# Patient Record
Sex: FEMALE | Race: WHITE
[De-identification: names, ages, dates, MRNs, and addresses within clinical notes are randomized per-mention and may not be internally consistent; named-entity substitution may affect disease eponyms.]

---

## 2018-01-10 ENCOUNTER — HOSPITAL ENCOUNTER (OUTPATIENT)
Dept: HOSPITAL 54 - DS | Age: 53
Discharge: HOME | End: 2018-01-10
Attending: SPECIALIST
Payer: COMMERCIAL

## 2018-01-10 DIAGNOSIS — Z88.8: ICD-10-CM

## 2018-01-10 DIAGNOSIS — Z87.891: ICD-10-CM

## 2018-01-10 DIAGNOSIS — G47.33: ICD-10-CM

## 2018-01-10 DIAGNOSIS — M65.311: ICD-10-CM

## 2018-01-10 DIAGNOSIS — G56.01: Primary | ICD-10-CM

## 2018-01-10 PROCEDURE — A6402 STERILE GAUZE <= 16 SQ IN: HCPCS

## 2018-01-10 PROCEDURE — A4217 STERILE WATER/SALINE, 500 ML: HCPCS

## 2019-08-08 ENCOUNTER — OFFICE (OUTPATIENT)
Dept: URBAN - METROPOLITAN AREA CLINIC 66 | Facility: CLINIC | Age: 54
End: 2019-08-08

## 2019-08-08 VITALS — DIASTOLIC BLOOD PRESSURE: 84 MMHG | SYSTOLIC BLOOD PRESSURE: 118 MMHG | HEIGHT: 61 IN

## 2019-08-08 DIAGNOSIS — Z12.11 SCREENING FOR COLONIC NEOPLASIA: ICD-10-CM

## 2019-08-08 DIAGNOSIS — K21.9 GERD: ICD-10-CM

## 2019-08-08 PROCEDURE — 99203 OFFICE O/P NEW LOW 30 MIN: CPT | Performed by: INTERNAL MEDICINE

## 2019-08-08 NOTE — SERVICEHPINOTES
The patient is seen for the evaluation of suspected GERD.    Noted the onset of   heartburn, regurgitation and epigastric pain  several  years   ago  .   Symptoms have been occurring    time(s) per   .    During a given day, they are most prevalent   .    They are exacerbated by   .   In the past, the patient has tried   .   Currently takes    dosed   .   On this therapy, symptom response has been   .    Associated symptoms include   .      Has also noted atypical (non-esophageal) symptoms including   .    A prior EGD has been performed and showed   .

## 2025-01-23 ENCOUNTER — HOSPITAL ENCOUNTER (EMERGENCY)
Dept: HOSPITAL 99 - EMR | Age: 60
Discharge: HOME | End: 2025-01-23
Payer: SELF-PAY

## 2025-01-23 VITALS — DIASTOLIC BLOOD PRESSURE: 98 MMHG | RESPIRATION RATE: 18 BRPM | SYSTOLIC BLOOD PRESSURE: 171 MMHG

## 2025-01-23 DIAGNOSIS — S20.219A: ICD-10-CM
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
## 2025-01-23 DIAGNOSIS — S46.911A: ICD-10-CM

## 2025-01-23 DIAGNOSIS — S16.1XXA: Primary | ICD-10-CM
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
## 2025-01-23 LAB
ALBUMIN SERPL-MCNC: 4.3 G/DL (ref 3.5–5)
ALP SERPL-CCNC: 60 U/L (ref 38–126)
ALT SERPL-CCNC: 25 U/L (ref 0–35)
AST SERPL-CCNC: 19 U/L (ref 14–36)
BUN SERPL-MCNC: 17 MG/DL (ref 7–17)
CALCIUM SERPL-MCNC: 9.2 MG/DL (ref 8.4–10.2)
CHLORIDE SERPL-SCNC: 101 MMOL/L (ref 98–107)
CO2 SERPL-SCNC: 30 MMOL/L (ref 22–30)
EGFR: > 60
ERYTHROCYTE [DISTWIDTH] IN BLOOD BY AUTOMATED COUNT: 13.3 % (ref 11.5–14.5)
GLUCOSE SERPL-MCNC: 94 MG/DL (ref 70–99)
HCT VFR BLD AUTO: 44.3 % (ref 37–47)
HGB BLD-MCNC: 14.4 G/DL (ref 12–16)
MCHC RBC AUTO-ENTMCNC: 32.5 G/DL (ref 33–37)
MCV RBC AUTO: 85.7 FL (ref 81–99)
NRBC BLD AUTO-RTO: 0 %
PLATELET # BLD AUTO: 269 10^3/UL (ref 130–400)
POTASSIUM SERPL-SCNC: 4 MMOL/L (ref 3.5–5.1)
PROT SERPL-MCNC: 7 G/DL (ref 6.3–8.2)
SODIUM SERPL-SCNC: 141 MMOL/L (ref 135–145)
TROPONIN I SERPL-MCNC: < 0.012 NG/ML

## 2025-01-23 PROCEDURE — 99283 EMERGENCY DEPT VISIT LOW MDM: CPT
